# Patient Record
Sex: FEMALE | URBAN - METROPOLITAN AREA
[De-identification: names, ages, dates, MRNs, and addresses within clinical notes are randomized per-mention and may not be internally consistent; named-entity substitution may affect disease eponyms.]

---

## 2023-05-23 ENCOUNTER — OFFICE VISIT (OUTPATIENT)
Dept: URBAN - METROPOLITAN AREA CLINIC 56 | Facility: LOCATION | Age: 63
End: 2023-05-23
Payer: COMMERCIAL

## 2023-05-23 DIAGNOSIS — H25.13 AGE-RELATED NUCLEAR CATARACT, BILATERAL: Primary | ICD-10-CM

## 2023-05-23 DIAGNOSIS — H53.2 DIPLOPIA: ICD-10-CM

## 2023-05-23 PROCEDURE — 92004 COMPRE OPH EXAM NEW PT 1/>: CPT | Performed by: STUDENT IN AN ORGANIZED HEALTH CARE EDUCATION/TRAINING PROGRAM

## 2023-05-23 PROCEDURE — 92134 CPTRZ OPH DX IMG PST SGM RTA: CPT | Performed by: STUDENT IN AN ORGANIZED HEALTH CARE EDUCATION/TRAINING PROGRAM

## 2023-05-23 ASSESSMENT — KERATOMETRY
OS: 44.12
OD: 43.92

## 2023-05-23 ASSESSMENT — VISUAL ACUITY
OD: 20/20
OS: 20/20

## 2023-05-23 ASSESSMENT — INTRAOCULAR PRESSURE
OS: 16
OD: 16

## 2023-05-23 NOTE — IMPRESSION/PLAN
Impression: Diplopia: H53.2. Plan: specs are 1year old, 4BO PD - by outside provider, sees well. No change to spec Rx today.